# Patient Record
Sex: FEMALE | Race: BLACK OR AFRICAN AMERICAN | NOT HISPANIC OR LATINO | Employment: FULL TIME | URBAN - METROPOLITAN AREA
[De-identification: names, ages, dates, MRNs, and addresses within clinical notes are randomized per-mention and may not be internally consistent; named-entity substitution may affect disease eponyms.]

---

## 2018-05-12 ENCOUNTER — HOSPITAL ENCOUNTER (EMERGENCY)
Facility: HOSPITAL | Age: 58
Discharge: HOME | End: 2018-05-12
Payer: MEDICAID

## 2018-05-12 VITALS
WEIGHT: 135 LBS | TEMPERATURE: 98 F | SYSTOLIC BLOOD PRESSURE: 131 MMHG | BODY MASS INDEX: 24.69 KG/M2 | HEART RATE: 68 BPM | OXYGEN SATURATION: 99 % | DIASTOLIC BLOOD PRESSURE: 68 MMHG | RESPIRATION RATE: 18 BRPM

## 2018-05-12 DIAGNOSIS — S00.83XA FACIAL CONTUSION, INITIAL ENCOUNTER: Primary | ICD-10-CM

## 2018-05-12 DIAGNOSIS — S29.9XXA CHEST TRAUMA, INITIAL ENCOUNTER: ICD-10-CM

## 2018-05-12 DIAGNOSIS — S20.212A CONTUSION OF LEFT CHEST WALL, INITIAL ENCOUNTER: ICD-10-CM

## 2018-05-12 DIAGNOSIS — S09.93XA FACIAL TRAUMA, INITIAL ENCOUNTER: ICD-10-CM

## 2018-05-12 PROCEDURE — 99283 EMERGENCY DEPT VISIT LOW MDM: CPT

## 2018-05-12 RX ORDER — TRAMADOL HYDROCHLORIDE 50 MG/1
50 TABLET ORAL EVERY 6 HOURS PRN
Qty: 11 TABLET | Refills: 0 | OUTPATIENT
Start: 2018-05-12 | End: 2019-05-07

## 2018-05-12 RX ORDER — NAPROXEN 500 MG/1
500 TABLET ORAL 2 TIMES DAILY WITH MEALS
Qty: 20 TABLET | Refills: 0 | OUTPATIENT
Start: 2018-05-12 | End: 2019-05-07
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
## 2019-05-07 ENCOUNTER — HOSPITAL ENCOUNTER (EMERGENCY)
Facility: HOSPITAL | Age: 59
Discharge: HOME | End: 2019-05-07
Attending: SURGERY
Payer: MEDICAID

## 2019-05-07 VITALS
HEART RATE: 56 BPM | RESPIRATION RATE: 16 BRPM | WEIGHT: 135 LBS | SYSTOLIC BLOOD PRESSURE: 125 MMHG | BODY MASS INDEX: 24.84 KG/M2 | TEMPERATURE: 98 F | HEIGHT: 62 IN | DIASTOLIC BLOOD PRESSURE: 63 MMHG | OXYGEN SATURATION: 97 %

## 2019-05-07 DIAGNOSIS — G89.29 CHRONIC BILATERAL LOW BACK PAIN WITHOUT SCIATICA: Primary | ICD-10-CM

## 2019-05-07 DIAGNOSIS — M54.50 CHRONIC BILATERAL LOW BACK PAIN WITHOUT SCIATICA: Primary | ICD-10-CM

## 2019-05-07 PROCEDURE — 96372 THER/PROPH/DIAG INJ SC/IM: CPT | Mod: ER

## 2019-05-07 PROCEDURE — 63600175 PHARM REV CODE 636 W HCPCS: Mod: ER | Performed by: PHYSICIAN ASSISTANT

## 2019-05-07 PROCEDURE — 99284 EMERGENCY DEPT VISIT MOD MDM: CPT | Mod: 25,ER

## 2019-05-07 RX ORDER — DEXAMETHASONE SODIUM PHOSPHATE 4 MG/ML
8 INJECTION, SOLUTION INTRA-ARTICULAR; INTRALESIONAL; INTRAMUSCULAR; INTRAVENOUS; SOFT TISSUE
Status: COMPLETED | OUTPATIENT
Start: 2019-05-07 | End: 2019-05-07

## 2019-05-07 RX ORDER — DICLOFENAC SODIUM 50 MG/1
50 TABLET, DELAYED RELEASE ORAL 2 TIMES DAILY
Qty: 10 TABLET | Refills: 0 | Status: SHIPPED | OUTPATIENT
Start: 2019-05-07 | End: 2019-12-19

## 2019-05-07 RX ORDER — CYPROHEPTADINE HYDROCHLORIDE 4 MG/1
4 TABLET ORAL 3 TIMES DAILY PRN
COMMUNITY
End: 2019-12-19

## 2019-05-07 RX ADMIN — DEXAMETHASONE SODIUM PHOSPHATE 8 MG: 4 INJECTION INTRA-ARTICULAR; INTRALESIONAL; INTRAMUSCULAR; INTRAVENOUS; SOFT TISSUE at 09:05

## 2019-05-07 NOTE — DISCHARGE INSTRUCTIONS
YourX-ray revealed evidence of degenerative changes to her low back.  Your advised to follow up with her primary care provider for re-evaluation within 3 days.  Your instructed to return to the emergency department immediately for any new or worsening symptoms.

## 2019-05-07 NOTE — ED PROVIDER NOTES
Encounter Date: 2019       History     Chief Complaint   Patient presents with    Back Pain     Pt c/o lower back pain for months, states is worse over past few weeks.  No new trauma, states has been treated for same c/o in past.  Pt states needs refill on diclofenac 75mg, states medicine is .      58-year-old female presents to the emergency department for evaluation of acute exacerbation of chronic low back pain.  She reports that she has a history of bulging discs in her low back since  which has caused her pain since that time.  She reports that over the last couple of weeks the pain is seemed to increase and she is out of the diclofenac her physician usually gives her.  She states that last year she received a steroid shot for her low back which seemed to help for a few weeks and wanted to see if she could receive 1 today.  She states that she has not had a steroid shot since that time last year.  She reports that it is  a constant, throbbing, achy  pain in her low back without radiation down her lower extremities or to the upper back/neck.  No treatment was attempted at home.  She denies any other associated symptoms including headache, neck pain, chest pain, abdominal pain, nausea, vomiting, flank pain, dysuria, hematuria or numbness/weakness/tingling/swelling to the lower extremities.  She denies any bowel or bladder incontinence.          Review of patient's allergies indicates:  No Known Allergies  Past Medical History:   Diagnosis Date    Arthritis     Disc degeneration     Osteoarthritis     Thyroid disease      Past Surgical History:   Procedure Laterality Date    FOOT FRACTURE SURGERY Left     RECONSTRUCTION OF NOSE       Family History   Problem Relation Age of Onset    Asthma Mother     Cancer Mother     No Known Problems Father     No Known Problems Son      Social History     Tobacco Use    Smoking status: Never Smoker    Smokeless tobacco: Never Used   Substance Use  Topics    Alcohol use: No    Drug use: No     Review of Systems   Constitutional: Negative for activity change, appetite change and fever.   HENT: Negative for congestion, nosebleeds, rhinorrhea, sinus pain, sore throat, trouble swallowing and voice change.    Eyes: Negative for photophobia and visual disturbance.   Respiratory: Negative for cough, chest tightness, shortness of breath and wheezing.    Cardiovascular: Negative for chest pain.   Gastrointestinal: Negative for abdominal pain, constipation, diarrhea, nausea and vomiting.   Genitourinary: Negative for decreased urine volume, dysuria, flank pain, hematuria, vaginal bleeding, vaginal discharge and vaginal pain.   Musculoskeletal: Positive for back pain. Negative for joint swelling, neck pain and neck stiffness.   Neurological: Negative for dizziness, syncope, weakness, light-headedness, numbness and headaches.       Physical Exam     Initial Vitals [05/07/19 0831]   BP Pulse Resp Temp SpO2   125/63 (!) 56 16 98.1 °F (36.7 °C) 97 %      MAP       --         Physical Exam    Nursing note and vitals reviewed.  Constitutional: She appears well-developed and well-nourished. She is not diaphoretic. No distress.   HENT:   Head: Normocephalic and atraumatic.   Right Ear: External ear normal.   Left Ear: External ear normal.   Nose: Nose normal.   Mouth/Throat: Oropharynx is clear and moist.   Eyes: Conjunctivae and EOM are normal. Pupils are equal, round, and reactive to light.   Neck: Normal range of motion. Neck supple.   Cardiovascular: Normal rate, regular rhythm and normal heart sounds.   Pulmonary/Chest: Breath sounds normal. No respiratory distress. She has no wheezes. She has no rhonchi. She has no rales. She exhibits no tenderness.   Abdominal: Soft. Bowel sounds are normal. She exhibits no distension. There is no tenderness. There is no rebound.   Musculoskeletal:        Right hip: She exhibits normal range of motion, normal strength, no tenderness and  no bony tenderness.        Left hip: She exhibits normal range of motion, normal strength, no tenderness and no bony tenderness.        Cervical back: She exhibits normal range of motion, no tenderness, no bony tenderness and no swelling.        Thoracic back: She exhibits normal range of motion, no tenderness and no bony tenderness.        Lumbar back: She exhibits tenderness and bony tenderness. She exhibits normal range of motion, no swelling, no edema and no deformity.   Lymphadenopathy:     She has no cervical adenopathy.   Neurological: She is alert and oriented to person, place, and time.   Skin: Skin is warm and dry.   Psychiatric: She has a normal mood and affect.         ED Course   Procedures  Labs Reviewed - No data to display       Imaging Results          X-Ray Lumbar Spine Ap And Lateral (Final result)  Result time 05/07/19 08:49:47    Final result by TREVOR Rodriguez Sr., MD (05/07/19 08:49:47)                 Impression:      1. There is a mild amount dextroconvex curvature of the thoracolumbar spine.  2. There are mild degenerative changes between L2 and L3.      Electronically signed by: Neo Rodriguez MD  Date:    05/07/2019  Time:    08:49             Narrative:    EXAMINATION:  XR LUMBAR SPINE AP AND LATERAL    CLINICAL HISTORY:  Low back pain, <6wks, no red flags, no prior management;    COMPARISON:  None    FINDINGS:  There are 5 lumbar type vertebral bodies.  There is a mild amount dextroconvex curvature of the thoracolumbar spine.  There is no fracture or spondylolisthesis.  There is normal lumbar lordosis.  There are mild degenerative changes between L2 and L3.                                 Medical Decision Making:   Initial Assessment:   [id number] presents for evaluation of acute exacerbation of chronic low back pain. Physical exam reveals a nontoxic-appearing female in no acute distress. Patient is afebrile vital signs within normal limits.  Neurological exam reveals an alert  and oriented patient.  Lungs clear to auscultation bilaterally.  No respiratory distress or accessory muscle use noted.  Abdominal exam reveals soft abdomen, nontender palpation. No CVA tenderness noted. No tenderness to palpation noted over the paraspinal musculature or the spinous processes of the cervical or thoracic spine.  Tenderness to palpation noted over the paraspinal musculature in the spinous processes of the lumbar spine.  No bony instability or step-offs noted.  Full range of motion, sensation and peripheral pulses intact in lower extremities bilaterally. Patient ambulates well without hesitation or gait abnormality.  Differential Diagnosis:   Arthralgia  Chronic back pain  Degenerative disc disease  ED Management:  X-ray reveals a small amount of Dr. curvature of the thoracolumbar spine.  There is mild degenerative changes between L2 and L3.  Patient was given Decadron for symptom control.  Instructed the patient to follow up with her primary care provider for re-evaluation within 3 days.  Instructed patient to return to the emergency department immediately for any new or worsening symptoms.                      Clinical Impression:       ICD-10-CM ICD-9-CM   1. Chronic bilateral low back pain without sciatica M54.5 724.2    G89.29 338.29                                Diandra Benavides PA-C  05/07/19 0916

## 2019-10-10 ENCOUNTER — HOSPITAL ENCOUNTER (EMERGENCY)
Facility: HOSPITAL | Age: 59
Discharge: HOME | End: 2019-10-10
Attending: SURGERY
Payer: MEDICAID

## 2019-10-10 VITALS
SYSTOLIC BLOOD PRESSURE: 134 MMHG | HEIGHT: 62 IN | DIASTOLIC BLOOD PRESSURE: 76 MMHG | HEART RATE: 78 BPM | TEMPERATURE: 98 F | BODY MASS INDEX: 23 KG/M2 | RESPIRATION RATE: 18 BRPM | WEIGHT: 125 LBS | OXYGEN SATURATION: 100 %

## 2019-10-10 DIAGNOSIS — M54.30 BACK PAIN WITH SCIATICA: ICD-10-CM

## 2019-10-10 DIAGNOSIS — M54.9 BACK PAIN WITH SCIATICA: ICD-10-CM

## 2019-10-10 DIAGNOSIS — N30.01 ACUTE CYSTITIS WITH HEMATURIA: Primary | ICD-10-CM

## 2019-10-10 LAB
BACTERIA #/AREA URNS AUTO: ABNORMAL /HPF
BILIRUB UR QL STRIP: NEGATIVE
CLARITY UR REFRACT.AUTO: CLEAR
COLOR UR AUTO: ABNORMAL
GLUCOSE UR QL STRIP: NEGATIVE
HGB UR QL STRIP: ABNORMAL
KETONES UR QL STRIP: ABNORMAL
LEUKOCYTE ESTERASE UR QL STRIP: ABNORMAL
MICROSCOPIC COMMENT: ABNORMAL
NITRITE UR QL STRIP: NEGATIVE
PH UR STRIP: 5 [PH] (ref 5–8)
PROT UR QL STRIP: ABNORMAL
RBC #/AREA URNS AUTO: 3 /HPF (ref 0–4)
SP GR UR STRIP: 1.02 (ref 1–1.03)
URN SPEC COLLECT METH UR: ABNORMAL
UROBILINOGEN UR STRIP-ACNC: ABNORMAL EU/DL
WBC #/AREA URNS AUTO: 15 /HPF (ref 0–5)

## 2019-10-10 PROCEDURE — 99284 EMERGENCY DEPT VISIT MOD MDM: CPT | Mod: 25,ER

## 2019-10-10 PROCEDURE — 96372 THER/PROPH/DIAG INJ SC/IM: CPT | Mod: ER

## 2019-10-10 PROCEDURE — 81000 URINALYSIS NONAUTO W/SCOPE: CPT | Mod: ER

## 2019-10-10 PROCEDURE — 87086 URINE CULTURE/COLONY COUNT: CPT | Mod: ER

## 2019-10-10 PROCEDURE — 63600175 PHARM REV CODE 636 W HCPCS: Mod: ER | Performed by: SURGERY

## 2019-10-10 PROCEDURE — 25000003 PHARM REV CODE 250: Mod: ER | Performed by: SURGERY

## 2019-10-10 RX ORDER — NITROFURANTOIN 25; 75 MG/1; MG/1
100 CAPSULE ORAL 2 TIMES DAILY
Qty: 10 CAPSULE | Refills: 0 | Status: SHIPPED | OUTPATIENT
Start: 2019-10-10 | End: 2019-10-15

## 2019-10-10 RX ORDER — BUTALBITAL, ACETAMINOPHEN AND CAFFEINE 50; 325; 40 MG/1; MG/1; MG/1
2 TABLET ORAL
Status: COMPLETED | OUTPATIENT
Start: 2019-10-10 | End: 2019-10-10

## 2019-10-10 RX ORDER — BUTALBITAL, ACETAMINOPHEN AND CAFFEINE 50; 325; 40 MG/1; MG/1; MG/1
1 TABLET ORAL EVERY 6 HOURS PRN
Qty: 20 TABLET | Refills: 0 | Status: SHIPPED | OUTPATIENT
Start: 2019-10-10 | End: 2019-11-09

## 2019-10-10 RX ORDER — CEPHALEXIN 500 MG/1
500 CAPSULE ORAL EVERY 8 HOURS
Qty: 15 CAPSULE | Refills: 0 | Status: SHIPPED | OUTPATIENT
Start: 2019-10-10 | End: 2019-10-10

## 2019-10-10 RX ORDER — HYDROCODONE BITARTRATE AND HOMATROPINE METHYLBROMIDE ORAL SOLUTION 5; 1.5 MG/5ML; MG/5ML
5 LIQUID ORAL EVERY 4 HOURS PRN
Qty: 90 ML | Refills: 0 | Status: SHIPPED | OUTPATIENT
Start: 2019-10-10 | End: 2019-10-10

## 2019-10-10 RX ORDER — CEFTRIAXONE 1 G/1
250 INJECTION, POWDER, FOR SOLUTION INTRAMUSCULAR; INTRAVENOUS
Status: COMPLETED | OUTPATIENT
Start: 2019-10-10 | End: 2019-10-10

## 2019-10-10 RX ORDER — FLUCONAZOLE 200 MG/1
200 TABLET ORAL DAILY
Qty: 2 TABLET | Refills: 0 | Status: SHIPPED | OUTPATIENT
Start: 2019-10-10 | End: 2019-10-12

## 2019-10-10 RX ORDER — AZITHROMYCIN 250 MG/1
1000 TABLET, FILM COATED ORAL
Status: COMPLETED | OUTPATIENT
Start: 2019-10-10 | End: 2019-10-10

## 2019-10-10 RX ADMIN — CEFTRIAXONE SODIUM 250 MG: 1 INJECTION, POWDER, FOR SOLUTION INTRAMUSCULAR; INTRAVENOUS at 08:10

## 2019-10-10 RX ADMIN — AZITHROMYCIN MONOHYDRATE 1000 MG: 250 TABLET ORAL at 08:10

## 2019-10-10 RX ADMIN — BUTALBITAL, ACETAMINOPHEN, AND CAFFEINE 2 TABLET: 50; 325; 40 TABLET ORAL at 07:10

## 2019-10-10 NOTE — ED NOTES
[facility] instructions given, pt verbalized understanding, pt ambulatory to restroom, steady gait noted

## 2019-10-10 NOTE — ED PROVIDER NOTES
"Encounter Date: 10/10/2019       History     Chief Complaint   Patient presents with    Abdominal Pain     Lower quadrant abdominal/pelvic pain radiating to back with malodorous vaginal discharge for several months.  "It's because my bowels are backed up into my back because of the herniated disc, it's happened before."  Denies any dysuria/hematuria, admits to having unprotected sex     She complains of chronic back pain which is worse today pain radiates down to her flanks and down both legs she also complains of dysuria    The history is provided by the patient.   Dysuria    This is a new problem. The current episode started several weeks ago. The problem occurs intermittently. The problem has been unchanged. The quality of the pain is described as burning. She is sexually active. She has tried nothing for the symptoms. Her past medical history does not include kidney stones.     Review of patient's allergies indicates:  No Known Allergies  Past Medical History:   Diagnosis Date    Arthritis     Disc degeneration     Osteoarthritis     Thyroid disease      Past Surgical History:   Procedure Laterality Date    FOOT FRACTURE SURGERY Left     HYSTERECTOMY      RECONSTRUCTION OF NOSE       Family History   Problem Relation Age of Onset    Asthma Mother     Cancer Mother     No Known Problems Father     No Known Problems Son      Social History     Tobacco Use    Smoking status: Never Smoker    Smokeless tobacco: Never Used   Substance Use Topics    Alcohol use: No    Drug use: No     Review of Systems   Constitutional: Negative.    HENT: Negative.    Eyes: Negative.    Respiratory: Negative.    Cardiovascular: Negative.    Gastrointestinal: Negative.    Endocrine: Negative.    Genitourinary: Positive for dysuria.   Musculoskeletal: Positive for back pain.   Skin: Negative.    Allergic/Immunologic: Negative.    Hematological: Negative.    Psychiatric/Behavioral: Negative.        Physical Exam "     Initial Vitals [10/10/19 0700]   BP Pulse Resp Temp SpO2   129/67 84 18 98.1 °F (36.7 °C) 98 %      MAP       --         Physical Exam    Nursing note and vitals reviewed.  Constitutional: She appears well-developed and well-nourished.   HENT:   Head: Normocephalic.   Eyes: Conjunctivae are normal.   Neck: Normal range of motion.   Cardiovascular: Normal rate.   Pulmonary/Chest: Breath sounds normal.   Abdominal: Soft. There is no tenderness. There is no guarding.   Genitourinary: Vagina normal.   Musculoskeletal: She exhibits tenderness.        Lumbar back: She exhibits tenderness. She exhibits normal range of motion, no bony tenderness, no deformity, no pain, no spasm and normal pulse.        Back:    Pain on palpation   Neurological: She is alert and oriented to person, place, and time. GCS score is 15. GCS eye subscore is 4. GCS verbal subscore is 5. GCS motor subscore is 6.   Skin: Skin is warm.   Psychiatric: She has a normal mood and affect.         ED Course   Procedures  Labs Reviewed   URINALYSIS, REFLEX TO URINE CULTURE - Abnormal; Notable for the following components:       Result Value    Protein, UA Trace (*)     Ketones, UA 1+ (*)     Occult Blood UA 2+ (*)     Urobilinogen, UA 4.0-6.0 (*)     Leukocytes, UA 3+ (*)     All other components within normal limits    Narrative:     Preferred Collection Type->Urine, Clean Catch   URINALYSIS MICROSCOPIC - Abnormal; Notable for the following components:    WBC, UA 15 (*)     All other components within normal limits    Narrative:     Preferred Collection Type->Urine, Clean Catch   C. TRACHOMATIS/N. GONORRHOEAE BY AMP DNA   CULTURE, URINE          Imaging Results    None          Medical Decision Making:   Initial Assessment:   UTI with chronic back pain  ED Management:  Recommend treatment with antibiotics for UTI await culture and sensitivity referred to pain management for back pain                      Clinical Impression:       ICD-10-CM ICD-9-CM    1. Acute cystitis with hematuria N30.01 595.0   2. Back pain with sciatica M54.9 724.3    M54.30          Disposition:   Disposition: Discharged  Condition: Stable                        OMID Escamilla III, MD  10/10/19 1000

## 2019-10-11 LAB — BACTERIA UR CULT: NO GROWTH

## 2019-12-19 ENCOUNTER — HOSPITAL ENCOUNTER (EMERGENCY)
Facility: HOSPITAL | Age: 59
Discharge: HOME | End: 2019-12-19
Attending: FAMILY MEDICINE
Payer: MEDICAID

## 2019-12-19 VITALS
WEIGHT: 111.38 LBS | DIASTOLIC BLOOD PRESSURE: 60 MMHG | HEART RATE: 50 BPM | RESPIRATION RATE: 19 BRPM | OXYGEN SATURATION: 100 % | SYSTOLIC BLOOD PRESSURE: 128 MMHG | TEMPERATURE: 98 F | BODY MASS INDEX: 20.38 KG/M2

## 2019-12-19 DIAGNOSIS — E03.9 HYPOTHYROIDISM, UNSPECIFIED TYPE: ICD-10-CM

## 2019-12-19 DIAGNOSIS — R07.9 CHEST PAIN: ICD-10-CM

## 2019-12-19 DIAGNOSIS — M25.50 POLYARTHRALGIA: Primary | ICD-10-CM

## 2019-12-19 LAB
ALBUMIN SERPL BCP-MCNC: 4.2 G/DL (ref 3.5–5.2)
ALP SERPL-CCNC: 117 U/L (ref 38–126)
ALT SERPL W/O P-5'-P-CCNC: 14 U/L (ref 10–44)
ANION GAP SERPL CALC-SCNC: 4 MMOL/L (ref 8–16)
AST SERPL-CCNC: 25 U/L (ref 15–46)
BASOPHILS # BLD AUTO: 0.03 K/UL (ref 0–0.2)
BASOPHILS NFR BLD: 0.7 % (ref 0–1.9)
BILIRUB SERPL-MCNC: 0.3 MG/DL (ref 0.1–1)
BUN SERPL-MCNC: 13 MG/DL (ref 7–17)
CALCIUM SERPL-MCNC: 9.6 MG/DL (ref 8.7–10.5)
CHLORIDE SERPL-SCNC: 108 MMOL/L (ref 95–110)
CO2 SERPL-SCNC: 28 MMOL/L (ref 23–29)
CREAT SERPL-MCNC: 0.73 MG/DL (ref 0.5–1.4)
DIFFERENTIAL METHOD: ABNORMAL
EOSINOPHIL # BLD AUTO: 0.1 K/UL (ref 0–0.5)
EOSINOPHIL NFR BLD: 2.4 % (ref 0–8)
ERYTHROCYTE [DISTWIDTH] IN BLOOD BY AUTOMATED COUNT: 12.4 % (ref 11.5–14.5)
EST. GFR  (AFRICAN AMERICAN): >60 ML/MIN/1.73 M^2
EST. GFR  (NON AFRICAN AMERICAN): >60 ML/MIN/1.73 M^2
GLUCOSE SERPL-MCNC: 82 MG/DL (ref 70–110)
HCT VFR BLD AUTO: 39.3 % (ref 37–48.5)
HGB BLD-MCNC: 13.1 G/DL (ref 12–16)
IMM GRANULOCYTES # BLD AUTO: 0.01 K/UL (ref 0–0.04)
IMM GRANULOCYTES NFR BLD AUTO: 0.2 % (ref 0–0.5)
LYMPHOCYTES # BLD AUTO: 2.3 K/UL (ref 1–4.8)
LYMPHOCYTES NFR BLD: 53.6 % (ref 18–48)
MCH RBC QN AUTO: 32.5 PG (ref 27–31)
MCHC RBC AUTO-ENTMCNC: 33.3 G/DL (ref 32–36)
MCV RBC AUTO: 98 FL (ref 82–98)
MONOCYTES # BLD AUTO: 0.4 K/UL (ref 0.3–1)
MONOCYTES NFR BLD: 9.4 % (ref 4–15)
NEUTROPHILS # BLD AUTO: 1.4 K/UL (ref 1.8–7.7)
NEUTROPHILS NFR BLD: 33.7 % (ref 38–73)
NRBC BLD-RTO: 0 /100 WBC
NT-PROBNP: 32 PG/ML (ref 5–900)
PLATELET # BLD AUTO: 190 K/UL (ref 150–350)
PMV BLD AUTO: 10.3 FL (ref 9.2–12.9)
POTASSIUM SERPL-SCNC: 3.9 MMOL/L (ref 3.5–5.1)
PROT SERPL-MCNC: 7.7 G/DL (ref 6–8.4)
RBC # BLD AUTO: 4.03 M/UL (ref 4–5.4)
SODIUM SERPL-SCNC: 140 MMOL/L (ref 136–145)
T4 FREE SERPL-MCNC: 0.8 NG/DL (ref 0.71–1.51)
TROPONIN I SERPL DL<=0.01 NG/ML-MCNC: <0.012 NG/ML (ref 0.01–0.03)
TROPONIN I SERPL DL<=0.01 NG/ML-MCNC: <0.012 NG/ML (ref 0.01–0.03)
TSH SERPL DL<=0.005 MIU/L-ACNC: 12.4 UIU/ML (ref 0.4–4)
WBC # BLD AUTO: 4.25 K/UL (ref 3.9–12.7)

## 2019-12-19 PROCEDURE — 84439 ASSAY OF FREE THYROXINE: CPT

## 2019-12-19 PROCEDURE — 83880 ASSAY OF NATRIURETIC PEPTIDE: CPT | Mod: ER

## 2019-12-19 PROCEDURE — 84484 ASSAY OF TROPONIN QUANT: CPT | Mod: ER

## 2019-12-19 PROCEDURE — 99285 EMERGENCY DEPT VISIT HI MDM: CPT | Mod: 25,ER

## 2019-12-19 PROCEDURE — 93010 EKG 12-LEAD: ICD-10-PCS | Mod: ,,, | Performed by: INTERNAL MEDICINE

## 2019-12-19 PROCEDURE — 93010 ELECTROCARDIOGRAM REPORT: CPT | Mod: ,,, | Performed by: INTERNAL MEDICINE

## 2019-12-19 PROCEDURE — 84443 ASSAY THYROID STIM HORMONE: CPT | Mod: ER

## 2019-12-19 PROCEDURE — 25000003 PHARM REV CODE 250: Mod: ER | Performed by: FAMILY MEDICINE

## 2019-12-19 PROCEDURE — 93005 ELECTROCARDIOGRAM TRACING: CPT | Mod: ER

## 2019-12-19 PROCEDURE — 85025 COMPLETE CBC W/AUTO DIFF WBC: CPT | Mod: ER

## 2019-12-19 PROCEDURE — 80053 COMPREHEN METABOLIC PANEL: CPT | Mod: ER

## 2019-12-19 RX ORDER — NAPROXEN SODIUM 220 MG
220 TABLET ORAL
COMMUNITY

## 2019-12-19 RX ORDER — LEVOTHYROXINE SODIUM 50 UG/1
50 TABLET ORAL DAILY
Qty: 30 TABLET | Refills: 0 | Status: SHIPPED | OUTPATIENT
Start: 2019-12-19 | End: 2020-01-18

## 2019-12-19 RX ORDER — ASPIRIN 325 MG
325 TABLET ORAL
Status: COMPLETED | OUTPATIENT
Start: 2019-12-19 | End: 2019-12-19

## 2019-12-19 RX ORDER — NAPROXEN 500 MG/1
500 TABLET ORAL 2 TIMES DAILY WITH MEALS
Qty: 60 TABLET | Refills: 0 | Status: SHIPPED | OUTPATIENT
Start: 2019-12-19

## 2019-12-19 RX ADMIN — ASPIRIN 325 MG ORAL TABLET 325 MG: 325 PILL ORAL at 06:12

## 2019-12-31 ENCOUNTER — HOSPITAL ENCOUNTER (EMERGENCY)
Facility: HOSPITAL | Age: 59
Discharge: HOME | End: 2019-12-31
Attending: SURGERY
Payer: MEDICAID

## 2019-12-31 VITALS
DIASTOLIC BLOOD PRESSURE: 70 MMHG | BODY MASS INDEX: 23.74 KG/M2 | OXYGEN SATURATION: 99 % | WEIGHT: 129 LBS | HEIGHT: 62 IN | SYSTOLIC BLOOD PRESSURE: 140 MMHG | HEART RATE: 66 BPM | RESPIRATION RATE: 18 BRPM | TEMPERATURE: 98 F

## 2019-12-31 DIAGNOSIS — R20.0 NUMBNESS AND TINGLING: ICD-10-CM

## 2019-12-31 DIAGNOSIS — R20.2 NUMBNESS AND TINGLING: ICD-10-CM

## 2019-12-31 DIAGNOSIS — R20.2 PARESTHESIAS IN RIGHT HAND: ICD-10-CM

## 2019-12-31 DIAGNOSIS — L72.3 SEBACEOUS CYST: Primary | ICD-10-CM

## 2019-12-31 PROCEDURE — 93010 ELECTROCARDIOGRAM REPORT: CPT | Mod: ,,, | Performed by: INTERNAL MEDICINE

## 2019-12-31 PROCEDURE — 25000003 PHARM REV CODE 250: Mod: ER | Performed by: SURGERY

## 2019-12-31 PROCEDURE — 93010 EKG 12-LEAD: ICD-10-PCS | Mod: ,,, | Performed by: INTERNAL MEDICINE

## 2019-12-31 PROCEDURE — 93005 ELECTROCARDIOGRAM TRACING: CPT | Mod: ER

## 2019-12-31 PROCEDURE — 99284 EMERGENCY DEPT VISIT MOD MDM: CPT | Mod: 25,ER

## 2019-12-31 RX ORDER — BUTALBITAL, ACETAMINOPHEN AND CAFFEINE 50; 325; 40 MG/1; MG/1; MG/1
1 TABLET ORAL
Status: COMPLETED | OUTPATIENT
Start: 2019-12-31 | End: 2019-12-31

## 2019-12-31 RX ORDER — BUTALBITAL, ACETAMINOPHEN AND CAFFEINE 50; 325; 40 MG/1; MG/1; MG/1
1 TABLET ORAL EVERY 6 HOURS PRN
Qty: 12 TABLET | Refills: 0 | Status: SHIPPED | OUTPATIENT
Start: 2019-12-31 | End: 2020-01-30

## 2019-12-31 RX ADMIN — BUTALBITAL, ACETAMINOPHEN, AND CAFFEINE 1 TABLET: 50; 325; 40 TABLET ORAL at 09:12

## 2019-12-31 NOTE — ED TRIAGE NOTES
Pt presents to Ed c/o pain and numbness to right side of face, right shoulder, right arm intermittently x 2 weeks but worsening today. Denies sob. Denies n/v/d. Denies blurred vision. Rates pain 8/10. No meds pta. Recent blood work .

## 2019-12-31 NOTE — ED PROVIDER NOTES
Encounter Date: 12/31/2019       History     Chief Complaint   Patient presents with    Numbness     Numbess to right shoulder, right arm and right breast area off and on for 1 week     Patient complains that her right arm and hand gets numb when she bends her arm in a certain direction or sleeps on it.  The symptoms have been going on for week.  She also complains of joint pain in her right shoulder and the her lateral right rib cage.  She got chest pain workup 1 week ago which was negative        Review of patient's allergies indicates:  No Known Allergies  Past Medical History:   Diagnosis Date    Arthritis     Disc degeneration     Osteoarthritis     Thyroid disease      Past Surgical History:   Procedure Laterality Date    FOOT FRACTURE SURGERY Left     HYSTERECTOMY      RECONSTRUCTION OF NOSE       Family History   Problem Relation Age of Onset    Asthma Mother     Cancer Mother     No Known Problems Father     No Known Problems Son      Social History     Tobacco Use    Smoking status: Never Smoker    Smokeless tobacco: Never Used   Substance Use Topics    Alcohol use: No    Drug use: No     Review of Systems   Constitutional: Negative.    HENT: Negative.    Eyes: Negative.    Respiratory: Negative.    Cardiovascular: Negative.    Gastrointestinal: Negative.    Endocrine: Negative.    Genitourinary: Negative.    Musculoskeletal: Negative.    Allergic/Immunologic: Negative.    Neurological: Positive for numbness.   Hematological: Negative.    Psychiatric/Behavioral: Negative.        Physical Exam     Initial Vitals [12/31/19 0905]   BP Pulse Resp Temp SpO2   138/63 64 18 97.5 °F (36.4 °C) 100 %      MAP       --         Physical Exam    Nursing note and vitals reviewed.  Constitutional: She appears well-developed and well-nourished.   HENT:   Head: Normocephalic.       Eyes: Conjunctivae are normal.   Neck: Normal range of motion.   Cardiovascular: Regular rhythm, normal heart sounds and intact  distal pulses.   Pulses:       Radial pulses are 3+ on the right side.   Bert cardia of 50  Palpable right axillary right brachial artery pulse   Pulmonary/Chest: Breath sounds normal.   Abdominal: Soft.   Musculoskeletal: Normal range of motion. She exhibits tenderness.        Right shoulder: She exhibits tenderness and bony tenderness.        Arms:  Neurological: She is alert and oriented to person, place, and time. No cranial nerve deficit or sensory deficit. GCS score is 15. GCS eye subscore is 4. GCS verbal subscore is 5. GCS motor subscore is 6.   NIH stroke scale 0   Skin: Skin is warm.   Psychiatric: She has a normal mood and affect.         ED Course   Procedures  Labs Reviewed - No data to display  EKG Readings: (Independently Interpreted)   Rhythm: Sinus Bradycardia. Heart Rate: 55. Ectopy: No Ectopy. Conduction: Normal. ST Segments: Normal ST Segments. T Waves: Normal. Clinical Impression: Normal Sinus Rhythm     ECG Results          EKG 12-lead (Final result)  Result time 12/31/19 12:15:11    Final result by Interface, Lab [facility] (12/31/19 12:15:11)                 Narrative:    Test Reason : R20.0,R20.2,    Vent. Rate : 055 BPM     Atrial Rate : 055 BPM     P-R Int : 200 ms          QRS Dur : 080 ms      QT Int : 422 ms       P-R-T Axes : 061 059 071 degrees     QTc Int : 403 ms    Sinus bradycardia  Nonspecific ST and T wave abnormality  Baseline Artifact    Confirmed by Andre PAGE, Brian BUTTERFIELD ([id number]) on 12/31/2019 12:15:06 PM    Referred By: [email or web address]   SELF           Confirmed By:Brian Powell MD                            Imaging Results          CT Head Without Contrast (Final result)  Result time 12/31/19 09:57:16    Final result by TREVOR Rodriguez Sr., MD (12/31/19 09:57:16)                 Impression:      1. There is a 17 mm oval shaped area of increased density in the subcutaneous fat overlying the posterior aspect of the left parietal bone.  If additional imaging evaluation is  clinically indicated, recommend consideration an MRI examination of the head without and with IV contrast.  2. No intracranial abnormality.  All CT scans at this facility use dose modulation, iterative reconstruction, and/or weight base dosing when appropriate to reduce radiation dose when appropriate to reduce radiation dose to as low as reasonably achievable.      Electronically signed by: Neo Rodriguez MD  Date:    12/31/2019  Time:    09:57             Narrative:    EXAMINATION:  CT HEAD WITHOUT CONTRAST    CLINICAL HISTORY:  Dizziness;    TECHNIQUE:  Standard brain CT protocol without IV contrast was performed.    COMPARISON:  None    FINDINGS:  The ventricles have a normal size, position, and appearance. There is no abnormal intracranial mass or intracranial hemorrhage. There is no skull fracture. The paranasal sinuses are normal in appearance.  There is a 17 mm oval shaped area of increased density in the subcutaneous fat overlying the posterior aspect of the left parietal bone.                                 Medical Decision Making:   Initial Assessment:   Sebaceous cyst scalp with polyarthralgia and paresthesias  ED Management:  Normal neurologic and vascular exam.  CT scan head is negative EKG does not show any acute changes  Recommend follow-up with neurologist for paresthesias and general surgeon for scalp cyst                                 Clinical Impression:       ICD-10-CM ICD-9-CM   1. Sebaceous cyst L72.3 706.2   2. Numbness and tingling R20.0 782.0    R20.2    3. Paresthesias in right hand R20.2 782.0         Disposition:   Disposition: Discharged  Condition: Stable                     OMID Escamilla III, MD  12/31/19 1047       OMID Escamilla III, MD  01/08/20 1115

## 2020-03-12 DIAGNOSIS — M54.50 LOW BACK PAIN: Primary | ICD-10-CM

## 2021-06-07 DIAGNOSIS — Z12.31 SCREENING MAMMOGRAM, ENCOUNTER FOR: Primary | ICD-10-CM

## 2021-06-08 ENCOUNTER — HOSPITAL ENCOUNTER (OUTPATIENT)
Dept: RADIOLOGY | Facility: HOSPITAL | Age: 61
Discharge: HOME | End: 2021-06-08
Attending: NURSE PRACTITIONER
Payer: MEDICAID

## 2021-06-08 DIAGNOSIS — M54.50 LOW BACK PAIN: ICD-10-CM

## 2021-06-08 PROCEDURE — 72100 X-RAY EXAM L-S SPINE 2/3 VWS: CPT | Mod: TC,FY,PO

## 2022-02-18 ENCOUNTER — HOSPITAL ENCOUNTER (OUTPATIENT)
Dept: RADIOLOGY | Facility: HOSPITAL | Age: 62
Discharge: HOME | End: 2022-02-18
Attending: NURSE PRACTITIONER
Payer: MEDICAID

## 2022-02-18 DIAGNOSIS — M54.50 ACUTE BILATERAL LOW BACK PAIN WITHOUT SCIATICA: ICD-10-CM

## 2022-02-18 PROCEDURE — 72100 X-RAY EXAM L-S SPINE 2/3 VWS: CPT | Mod: TC,FY,PO

## 2022-10-26 ENCOUNTER — HOSPITAL ENCOUNTER (EMERGENCY)
Facility: HOSPITAL | Age: 62
Discharge: HOME | End: 2022-10-26
Attending: EMERGENCY MEDICINE
Payer: MEDICAID

## 2022-10-26 VITALS
SYSTOLIC BLOOD PRESSURE: 122 MMHG | DIASTOLIC BLOOD PRESSURE: 85 MMHG | OXYGEN SATURATION: 99 % | RESPIRATION RATE: 17 BRPM | WEIGHT: 130 LBS | HEART RATE: 60 BPM | TEMPERATURE: 99 F | BODY MASS INDEX: 23.78 KG/M2

## 2022-10-26 DIAGNOSIS — K59.00 CONSTIPATION, UNSPECIFIED CONSTIPATION TYPE: ICD-10-CM

## 2022-10-26 DIAGNOSIS — R11.2 NAUSEA AND VOMITING, UNSPECIFIED VOMITING TYPE: Primary | ICD-10-CM

## 2022-10-26 LAB
ALBUMIN SERPL BCP-MCNC: 4.4 G/DL (ref 3.5–5.2)
ALP SERPL-CCNC: 92 U/L (ref 38–126)
ALT SERPL W/O P-5'-P-CCNC: 18 U/L (ref 10–44)
ANION GAP SERPL CALC-SCNC: 9 MMOL/L (ref 8–16)
AST SERPL-CCNC: 25 U/L (ref 15–46)
BASOPHILS # BLD AUTO: 0.02 K/UL (ref 0–0.2)
BASOPHILS NFR BLD: 0.6 % (ref 0–1.9)
BILIRUB SERPL-MCNC: 0.3 MG/DL (ref 0.1–1)
CALCIUM SERPL-MCNC: 9.2 MG/DL (ref 8.7–10.5)
CHLORIDE SERPL-SCNC: 110 MMOL/L (ref 95–110)
CO2 SERPL-SCNC: 26 MMOL/L (ref 23–29)
CREAT SERPL-MCNC: 0.65 MG/DL (ref 0.5–1.4)
DIFFERENTIAL METHOD: ABNORMAL
EOSINOPHIL # BLD AUTO: 0.1 K/UL (ref 0–0.5)
EOSINOPHIL NFR BLD: 2.5 % (ref 0–8)
ERYTHROCYTE [DISTWIDTH] IN BLOOD BY AUTOMATED COUNT: 12.2 % (ref 11.5–14.5)
EST. GFR  (NO RACE VARIABLE): >60 ML/MIN/1.73 M^2
GLUCOSE SERPL-MCNC: 98 MG/DL (ref 70–110)
HCT VFR BLD AUTO: 37.1 % (ref 37–48.5)
HGB BLD-MCNC: 12.5 G/DL (ref 12–16)
IMM GRANULOCYTES # BLD AUTO: 0 K/UL (ref 0–0.04)
IMM GRANULOCYTES NFR BLD AUTO: 0 % (ref 0–0.5)
LIPASE SERPL-CCNC: 28 U/L (ref 23–300)
LYMPHOCYTES # BLD AUTO: 1.7 K/UL (ref 1–4.8)
LYMPHOCYTES NFR BLD: 52.4 % (ref 18–48)
MCH RBC QN AUTO: 32.9 PG (ref 27–31)
MCHC RBC AUTO-ENTMCNC: 33.7 G/DL (ref 32–36)
MCV RBC AUTO: 98 FL (ref 82–98)
MONOCYTES # BLD AUTO: 0.4 K/UL (ref 0.3–1)
MONOCYTES NFR BLD: 12.7 % (ref 4–15)
NEUTROPHILS # BLD AUTO: 1 K/UL (ref 1.8–7.7)
NEUTROPHILS NFR BLD: 31.8 % (ref 38–73)
NRBC BLD-RTO: 0 /100 WBC
PLATELET # BLD AUTO: 199 K/UL (ref 150–450)
PMV BLD AUTO: 10.2 FL (ref 9.2–12.9)
POTASSIUM SERPL-SCNC: 3.8 MMOL/L (ref 3.5–5.1)
PROT SERPL-MCNC: 7.8 G/DL (ref 6–8.4)
RBC # BLD AUTO: 3.8 M/UL (ref 4–5.4)
SODIUM SERPL-SCNC: 145 MMOL/L (ref 136–145)
UUN UR-MCNC: 16 MG/DL (ref 7–17)
WBC # BLD AUTO: 3.15 K/UL (ref 3.9–12.7)

## 2022-10-26 PROCEDURE — 96374 THER/PROPH/DIAG INJ IV PUSH: CPT | Mod: ER

## 2022-10-26 PROCEDURE — 99285 EMERGENCY DEPT VISIT HI MDM: CPT | Mod: 25,ER

## 2022-10-26 PROCEDURE — 25500020 PHARM REV CODE 255: Mod: ER | Performed by: EMERGENCY MEDICINE

## 2022-10-26 PROCEDURE — 85025 COMPLETE CBC W/AUTO DIFF WBC: CPT | Mod: ER | Performed by: EMERGENCY MEDICINE

## 2022-10-26 PROCEDURE — 80053 COMPREHEN METABOLIC PANEL: CPT | Mod: ER | Performed by: EMERGENCY MEDICINE

## 2022-10-26 PROCEDURE — 83690 ASSAY OF LIPASE: CPT | Mod: ER | Performed by: EMERGENCY MEDICINE

## 2022-10-26 PROCEDURE — 25000003 PHARM REV CODE 250: Mod: ER | Performed by: EMERGENCY MEDICINE

## 2022-10-26 PROCEDURE — 63600175 PHARM REV CODE 636 W HCPCS: Mod: ER | Performed by: EMERGENCY MEDICINE

## 2022-10-26 PROCEDURE — 96361 HYDRATE IV INFUSION ADD-ON: CPT | Mod: ER

## 2022-10-26 RX ORDER — ONDANSETRON 4 MG/1
4 TABLET, ORALLY DISINTEGRATING ORAL EVERY 6 HOURS PRN
Qty: 20 TABLET | Refills: 0 | Status: SHIPPED | OUTPATIENT
Start: 2022-10-26

## 2022-10-26 RX ORDER — DOCUSATE SODIUM 100 MG/1
100 CAPSULE, LIQUID FILLED ORAL 2 TIMES DAILY PRN
Qty: 60 CAPSULE | Refills: 0 | Status: SHIPPED | OUTPATIENT
Start: 2022-10-26

## 2022-10-26 RX ORDER — ONDANSETRON 2 MG/ML
4 INJECTION INTRAMUSCULAR; INTRAVENOUS
Status: COMPLETED | OUTPATIENT
Start: 2022-10-26 | End: 2022-10-26

## 2022-10-26 RX ORDER — SODIUM CHLORIDE 9 MG/ML
INJECTION, SOLUTION INTRAVENOUS
Status: COMPLETED | OUTPATIENT
Start: 2022-10-26 | End: 2022-10-26

## 2022-10-26 RX ADMIN — IOHEXOL 100 ML: 350 INJECTION, SOLUTION INTRAVENOUS at 11:10

## 2022-10-26 RX ADMIN — SODIUM CHLORIDE: 0.9 INJECTION, SOLUTION INTRAVENOUS at 09:10

## 2022-10-26 RX ADMIN — ONDANSETRON HYDROCHLORIDE 4 MG: 2 INJECTION, SOLUTION INTRAMUSCULAR; INTRAVENOUS at 09:10

## 2022-10-26 NOTE — ED PROVIDER NOTES
Encounter Date: 10/26/2022       History     Chief Complaint   Patient presents with    Vomiting    Nausea    Abdominal Pain     I am having nausea vomiting and pain from my arthritis around my vagina. I am hurting all over. I missed work yesterday and need a work note also.      61-year-old female with a history of osteoarthritis presents with 1 day of vomiting with abdominal pain.  Patient also reports being constipated.  She reports diffuse body aches consistent with her osteoarthritis.  She denies fever, dysuria, cough, chest pain, shortness of breath or headache.  Last bowel movement was yesterday.      Review of patient's allergies indicates:  No Known Allergies  Past Medical History:   Diagnosis Date    Arthritis     Disc degeneration     Osteoarthritis     Thyroid disease      Past Surgical History:   Procedure Laterality Date    FOOT FRACTURE SURGERY Left     HYSTERECTOMY      RECONSTRUCTION OF NOSE       Family History   Problem Relation Age of Onset    Asthma Mother     Cancer Mother     No Known Problems Father     No Known Problems Son      Social History     Tobacco Use    Smoking status: Never    Smokeless tobacco: Never   Substance Use Topics    Alcohol use: No    Drug use: No     Review of Systems   Constitutional:  Negative for fever.   Eyes:  Negative for pain.   Respiratory:  Negative for shortness of breath.    Cardiovascular:  Negative for chest pain.   Gastrointestinal:  Positive for abdominal pain, constipation, nausea and vomiting.   Genitourinary:  Negative for difficulty urinating.   Musculoskeletal:  Positive for arthralgias. Negative for back pain and neck pain.   Neurological:  Negative for headaches.   Psychiatric/Behavioral:  Negative for confusion.      Physical Exam     Initial Vitals [10/26/22 0923]   BP Pulse Resp Temp SpO2   122/87 62 15 98.6 °F (37 °C) 98 %      MAP       --         Physical Exam    Nursing note and vitals reviewed.  HENT:   Head: Atraumatic.   Eyes:  Conjunctivae and EOM are normal.   Neck:   Normal range of motion.  Cardiovascular:      Exam reveals no gallop and no friction rub.       No murmur heard.  Pulmonary/Chest: Breath sounds normal. No respiratory distress.   Abdominal: Abdomen is soft. She exhibits no distension. There is abdominal tenderness (Diffuse). There is no rebound.   Musculoskeletal:      Cervical back: Normal range of motion.     Neurological: She is alert and oriented to person, place, and time.   Psychiatric: She has a normal mood and affect.       ED Course   Procedures  Labs Reviewed   CBC W/ AUTO DIFFERENTIAL - Abnormal; Notable for the following components:       Result Value    WBC 3.15 (*)     RBC 3.80 (*)     MCH 32.9 (*)     Gran # (ANC) 1.0 (*)     Gran % 31.8 (*)     Lymph % 52.4 (*)     All other components within normal limits   COMPREHENSIVE METABOLIC PANEL   LIPASE          Imaging Results              CT Abdomen Pelvis With Contrast (Final result)  Result time 10/26/22 11:26:00      Final result by TREVOR Rodriguez Sr., MD (10/26/22 11:26:00)                   Impression:      1. There is a prominent amount of fecal material within the colon.  2. The uterus is absent. The appendix is not definitely visualized. There are no inflammatory changes in the expected location of the appendix.  3. There is a mild amount dextroconvex curvature of the thoracolumbar spine.  4. There are mild degenerative changes between L2 and L3. There are moderate degenerative changes between L3 and L4.  5. There are minimal dependent atelectatic changes in both lungs.  There is a 4 mm noncalcified pulmonary nodule in the inferior aspect of the lingula.  All CT scans at this facility use dose modulation, iterative reconstruction, and/or weight base dosing when appropriate to reduce radiation dose when appropriate to reduce radiation dose to as low as reasonably achievable.      Electronically signed by: Neo Rodriguez  MD  Date:    10/26/2022  Time:    11:26               Narrative:    EXAMINATION:  CT ABDOMEN PELVIS WITH CONTRAST    CLINICAL HISTORY:  Abdominal pain, acute, nonlocalized;    TECHNIQUE:  Standard abdomen and pelvis CT protocol with IV contrast was performed.  100 mL of Omnipaque 350 contrast material was used for this examination.  There was no oral contrast administered.    COMPARISON:  None    FINDINGS:  Finding: The size of the heart is within normal limits.  There are minimal dependent atelectatic changes in both lungs.  There is a 4 mm noncalcified pulmonary nodule in the inferior aspect of the lingula.  There is no pneumothorax or pleural effusion.    The liver, gallbladder, pancreas, spleen, adrenals, and kidneys are normal in appearance. The ureters and the urinary bladder are normal in appearance.  The uterus is absent.  The appendix is not definitely visualized.  There are no inflammatory changes in the expected location of the appendix.  There is a prominent amount of fecal material within the colon.  The appendix and the rest of the gastrointestinal system are normal in appearance. There is no free fluid within the abdomen or pelvis. There is no pneumoperitoneum.  There is a mild amount of atherosclerosis.  There is a mild amount dextroconvex curvature of the thoracolumbar spine.  There are mild degenerative changes between L2 and L3.  There are moderate degenerative changes between L3 and L4.                                       Medications   ondansetron injection 4 mg (4 mg Intravenous Given 10/26/22 6040)   0.9%  NaCl infusion ( Intravenous New Bag 10/26/22 3470)   iohexoL (OMNIPAQUE 350) injection 100 mL (100 mLs Intravenous Given 10/26/22 1104)     Medical Decision Making:   Initial Assessment:   61-year-old female with vomiting today with diffuse abdominal pain.  Last bowel movement yesterday.  Vital signs unremarkable.  Diffuse abdominal tenderness without focality.  No peritoneal signs.  Labs  are grossly unremarkable.  CT abdomen pelvis shows large stool burden consistent with constipation.  No evidence of bowel obstruction.  No other acute surgical findings.  Suspect her constipation is the cause of her pain.  Will discharge home with stool softener and Zofran for symptomatic relief.  Encourage hydration.  Return instructions given.  Patient verbalized understanding and agreement with the plan.                        Clinical Impression:   Final diagnoses:  [R11.2] Nausea and vomiting, unspecified vomiting type (Primary)  [K59.00] Constipation, unspecified constipation type        ED Disposition Condition    Discharge Stable          ED Prescriptions       Medication Sig Dispense Start Date End Date Auth. Provider    docusate sodium (COLACE) 100 MG capsule Take 1 capsule (100 mg total) by mouth 2 (two) times daily as needed for Constipation. 60 capsule 10/26/2022 -- Karan Agustin MD    ondansetron (ZOFRAN-ODT) 4 MG TbDL Take 1 tablet (4 mg total) by mouth every 6 (six) hours as needed (nausea/vomiting). 20 tablet 10/26/2022 -- Karan Agustin MD          Follow-up Information       Follow up With Specialties Details Why Contact Info    [facility] Schedule an appointment as soon as possible for a visit in 3 days  [address] [place] [address] [id number] [phone]               Karan Agustin MD  10/26/22 5264

## 2022-10-26 NOTE — Clinical Note
"Annie Calderón (Lilly)ey was seen and treated in our emergency department on 10/26/2022.  She may return to work on 10/27/2022.       If you have any questions or concerns, please don't hesitate to call.       RN    "

## 2023-01-02 ENCOUNTER — HOSPITAL ENCOUNTER (EMERGENCY)
Facility: HOSPITAL | Age: 63
Discharge: HOME | End: 2023-01-02
Attending: EMERGENCY MEDICINE
Payer: MEDICAID

## 2023-01-02 VITALS
DIASTOLIC BLOOD PRESSURE: 57 MMHG | WEIGHT: 131 LBS | TEMPERATURE: 98 F | HEART RATE: 66 BPM | BODY MASS INDEX: 23.96 KG/M2 | OXYGEN SATURATION: 98 % | SYSTOLIC BLOOD PRESSURE: 123 MMHG | RESPIRATION RATE: 16 BRPM

## 2023-01-02 DIAGNOSIS — K02.9 DENTAL CARIES: ICD-10-CM

## 2023-01-02 DIAGNOSIS — K08.89 PAIN, DENTAL: Primary | ICD-10-CM

## 2023-01-02 PROCEDURE — 99284 EMERGENCY DEPT VISIT MOD MDM: CPT | Mod: ER

## 2023-01-02 RX ORDER — KETOROLAC TROMETHAMINE 10 MG/1
10 TABLET, FILM COATED ORAL EVERY 6 HOURS
Qty: 12 TABLET | Refills: 0 | Status: SHIPPED | OUTPATIENT
Start: 2023-01-02 | End: 2023-01-05

## 2023-01-02 RX ORDER — PENICILLIN V POTASSIUM 500 MG/1
500 TABLET, FILM COATED ORAL 4 TIMES DAILY
Qty: 40 TABLET | Refills: 0 | Status: SHIPPED | OUTPATIENT
Start: 2023-01-02 | End: 2023-01-09

## 2023-01-02 NOTE — Clinical Note
"Annie Manriquez" Vidal was seen and treated in our emergency department on 1/2/2023.  She may return to work on 01/03/2023.       If you have any questions or concerns, please don't hesitate to call.      Fabi Mcdonnell PA-C"

## 2023-01-03 NOTE — ED PROVIDER NOTES
Encounter Date: 1/2/2023       History     Chief Complaint   Patient presents with    Dental Pain     I am having right side dental/jaw mouth pain. I had a filling a while back. It started two days ago.      HPI: Annie Drake, a 62 y.o. female  has a past medical history of Arthritis, Disc degeneration, Osteoarthritis, and Thyroid disease.     She presents to the ED for evaluation of right upper dental pain.  Denies definable abscess.  No fevers.  States she is trying to find dental care.          The history is provided by the patient.   Review of patient's allergies indicates:  No Known Allergies  Past Medical History:   Diagnosis Date    Arthritis     Disc degeneration     Osteoarthritis     Thyroid disease      Past Surgical History:   Procedure Laterality Date    FOOT FRACTURE SURGERY Left     HYSTERECTOMY      RECONSTRUCTION OF NOSE       Family History   Problem Relation Age of Onset    Asthma Mother     Cancer Mother     No Known Problems Father     No Known Problems Son      Social History     Tobacco Use    Smoking status: Never    Smokeless tobacco: Never   Substance Use Topics    Alcohol use: No    Drug use: No     Review of Systems   Constitutional:  Negative for fever.   HENT:  Positive for dental problem. Negative for facial swelling.    Gastrointestinal:  Negative for nausea and vomiting.   Skin:  Negative for color change.   Allergic/Immunologic: Negative for immunocompromised state.   Neurological:  Negative for weakness.   Hematological:  Negative for adenopathy.   Psychiatric/Behavioral:  Negative for agitation.    All other systems reviewed and are negative.    Physical Exam     Initial Vitals [01/02/23 1329]   BP Pulse Resp Temp SpO2   (!) 123/57 66 16 98.2 °F (36.8 °C) 98 %      MAP       --         Physical Exam    Nursing note and vitals reviewed.  Constitutional: She appears well-developed and well-nourished. She is not diaphoretic. No distress.   HENT:   Head: Normocephalic and  atraumatic.   Right Ear: External ear normal.   Left Ear: External ear normal.   Nose: Nose normal.   Mouth/Throat: Abnormal dentition (overall poor dentition wtih multiple teeth fractured at gumline). Dental caries present. No dental abscesses.   Eyes: Conjunctivae and EOM are normal.   Neck:   Normal range of motion.  Cardiovascular:  Normal rate and regular rhythm.           Pulmonary/Chest: No respiratory distress.   Musculoskeletal:         General: Normal range of motion.      Cervical back: Normal range of motion.     Neurological: She is alert and oriented to person, place, and time.   Skin: Capillary refill takes less than 2 seconds. No rash noted.   Psychiatric: She has a normal mood and affect. Thought content normal.       ED Course   Procedures  Labs Reviewed - No data to display       Imaging Results    None          Medications - No data to display  Medical Decision Making:   Initial Assessment:   Dental pain   Differential Diagnosis:   Dental an, dental abscess, pulpitis, gingivitis, fractured tooth       ED Management:  Patient presents to ED for concern of dental pain.  There is no identifiable drainable abscess.  No trismus.  No facial swelling.  Symptoms and exam concerning for developing abscess.  Patient will be prescribed a course of anti-inflammatories and antibiotics and highly encouraged to follow up with dentist.  Strict return precautions given and patient verbalized understanding and agreement with plan.                            Clinical Impression:   Final diagnoses:  [K08.89] Pain, dental (Primary)  [K02.9] Dental caries        ED Disposition Condition    Discharge Stable          ED Prescriptions       Medication Sig Dispense Start Date End Date Auth. Provider    penicillin v potassium (VEETID) 500 MG tablet Take 1 tablet (500 mg total) by mouth 4 (four) times daily. for 7 days 40 tablet 1/2/2023 1/9/2023 Fabi Mcdonnell PA-C    ketorolac (TORADOL) 10 mg tablet Take 1 tablet (10  mg total) by mouth every 6 (six) hours. for 3 days 12 tablet 1/2/2023 1/5/2023 Fabi Mcdonnell PA-C          Follow-up Information       Follow up With Specialties Details Why Contact Info    [facility]   [address] [place] [id number]  [phone]               Fabi Mcdonnell PA-C  01/02/23 2025

## 2023-05-15 ENCOUNTER — HOSPITAL ENCOUNTER (EMERGENCY)
Facility: HOSPITAL | Age: 63
Discharge: HOME | End: 2023-05-15
Attending: EMERGENCY MEDICINE
Payer: MEDICAID

## 2023-05-15 VITALS
TEMPERATURE: 99 F | HEART RATE: 58 BPM | BODY MASS INDEX: 23 KG/M2 | WEIGHT: 125 LBS | SYSTOLIC BLOOD PRESSURE: 150 MMHG | OXYGEN SATURATION: 100 % | RESPIRATION RATE: 20 BRPM | HEIGHT: 62 IN | DIASTOLIC BLOOD PRESSURE: 76 MMHG

## 2023-05-15 DIAGNOSIS — A59.9 TRICHOMONAS INFECTION: ICD-10-CM

## 2023-05-15 DIAGNOSIS — G89.4 CHRONIC PAIN SYNDROME: Primary | ICD-10-CM

## 2023-05-15 LAB
BILIRUB UR QL STRIP: NEGATIVE
CLARITY UR REFRACT.AUTO: CLEAR
COLOR UR AUTO: YELLOW
GLUCOSE UR QL STRIP: NEGATIVE
HGB UR QL STRIP: ABNORMAL
KETONES UR QL STRIP: NEGATIVE
LEUKOCYTE ESTERASE UR QL STRIP: ABNORMAL
MICROSCOPIC COMMENT: ABNORMAL
NITRITE UR QL STRIP: NEGATIVE
PH UR STRIP: 6 [PH] (ref 5–8)
PROT UR QL STRIP: NEGATIVE
RBC #/AREA URNS AUTO: 15 /HPF (ref 0–4)
SP GR UR STRIP: >=1.03 (ref 1–1.03)
TRICHOMONAS UR QL COMP ASSIST: ABNORMAL
URN SPEC COLLECT METH UR: ABNORMAL
UROBILINOGEN UR STRIP-ACNC: NEGATIVE EU/DL
WBC #/AREA URNS AUTO: 30 /HPF (ref 0–5)
WBC CLUMPS UR QL AUTO: ABNORMAL

## 2023-05-15 PROCEDURE — 87086 URINE CULTURE/COLONY COUNT: CPT | Mod: ER | Performed by: PHYSICIAN ASSISTANT

## 2023-05-15 PROCEDURE — 81000 URINALYSIS NONAUTO W/SCOPE: CPT | Mod: ER | Performed by: PHYSICIAN ASSISTANT

## 2023-05-15 PROCEDURE — 99284 EMERGENCY DEPT VISIT MOD MDM: CPT | Mod: ER

## 2023-05-15 RX ORDER — METHOCARBAMOL 750 MG/1
1500 TABLET, FILM COATED ORAL 3 TIMES DAILY
Qty: 30 TABLET | Refills: 0 | Status: SHIPPED | OUTPATIENT
Start: 2023-05-15 | End: 2023-05-20

## 2023-05-15 RX ORDER — MELOXICAM 7.5 MG/1
7.5 TABLET ORAL DAILY
Qty: 30 TABLET | Refills: 0 | Status: SHIPPED | OUTPATIENT
Start: 2023-05-15

## 2023-05-15 RX ORDER — METRONIDAZOLE 500 MG/1
500 TABLET ORAL EVERY 12 HOURS
Qty: 14 TABLET | Refills: 0 | Status: SHIPPED | OUTPATIENT
Start: 2023-05-15 | End: 2023-05-22

## 2023-05-15 NOTE — ED PROVIDER NOTES
Encounter Date: 5/15/2023       History     Chief Complaint   Patient presents with    [facility] Medical Complaints     PT reports chronic pain in pelvis, hips, etc. PT reports urinary incontinence. Pt reports decreased appetite. PT reports she is tired of suffering. Denies SI or HI     HPI: Annie Drake, a 62 y.o. female  has a past medical history of Arthritis, Disc degeneration, Osteoarthritis, and Thyroid disease.     She presents to the ED with report of chronic pain and pelvis, hips and back as well as urinary incontinence.  She states that these issues have been going on for several years.  She has tried physical therapy but states that it is too painful.  Not currently on any chronic medicines for this issue.  No recent injury or fall.  Denies any dysuria, hematuria, vaginal itching or burning.        The history is provided by the patient.   Review of patient's allergies indicates:  No Known Allergies  Past Medical History:   Diagnosis Date    Arthritis     Disc degeneration     Osteoarthritis     Thyroid disease      Past Surgical History:   Procedure Laterality Date    FOOT FRACTURE SURGERY Left     HYSTERECTOMY      RECONSTRUCTION OF NOSE       Family History   Problem Relation Age of Onset    Asthma Mother     Cancer Mother     No Known Problems Father     No Known Problems Son      Social History     Tobacco Use    Smoking status: Never    Smokeless tobacco: Never   Substance Use Topics    Alcohol use: No    Drug use: No     Review of Systems   Constitutional:  Negative for fever.   HENT:  Negative for congestion.    Genitourinary:         Urinary incontinence times several years   Musculoskeletal:  Positive for arthralgias. Negative for neck stiffness.   Skin:  Negative for color change and rash.   Allergic/Immunologic: Negative for immunocompromised state.   Neurological:  Negative for weakness and numbness.   Hematological:  Negative for adenopathy.   Psychiatric/Behavioral:  Negative for agitation.     All other systems reviewed and are negative.    Physical Exam     Initial Vitals [05/15/23 0932]   BP Pulse Resp Temp SpO2   134/73 63 20 98.6 °F (37 °C) 100 %      MAP       --         Physical Exam    Nursing note and vitals reviewed.  Constitutional: She appears well-developed and well-nourished. She is not diaphoretic. No distress.   HENT:   Head: Normocephalic and atraumatic.   Right Ear: External ear normal.   Left Ear: External ear normal.   Nose: Nose normal.   Eyes: Conjunctivae and EOM are normal.   Neck:   Normal range of motion.  Cardiovascular:  Normal rate and regular rhythm.           Pulmonary/Chest: No respiratory distress. She has no wheezes. She has no rhonchi. She has no rales.   Abdominal: Abdomen is soft. Bowel sounds are normal. She exhibits no distension. There is no abdominal tenderness. There is no rebound and no guarding.   Musculoskeletal:         General: Normal range of motion.      Cervical back: Normal and normal range of motion.      Thoracic back: Normal.      Lumbar back: Normal.     Neurological: She is alert and oriented to person, place, and time.   Skin: Capillary refill takes less than 2 seconds. No rash noted.   Psychiatric: She has a normal mood and affect. Thought content normal.       ED Course   Procedures  Labs Reviewed   URINALYSIS, REFLEX TO URINE CULTURE - Abnormal; Notable for the following components:       Result Value    Specific Gravity, UA >=1.030 (*)     Occult Blood UA Trace (*)     Leukocytes, UA 1+ (*)     All other components within normal limits    Narrative:     Preferred Collection Type->Urine, Clean Catch  Specimen Source->Urine   URINALYSIS MICROSCOPIC - Abnormal; Notable for the following components:    RBC, UA 15 (*)     WBC, UA 30 (*)     Trichomonas, UA Rare (*)     All other components within normal limits    Narrative:     Preferred Collection Type->Urine, Clean Catch  Specimen Source->Urine   CULTURE, URINE          Imaging Results    None           Medications - No data to display  Medical Decision Making:   Initial Assessment:   Chronic back pain, urinary incontinence  Differential Diagnosis:   Muscular strain, UTI, STI  Clinical Tests:   Lab Tests: Ordered and Reviewed  The following lab test(s) were unremarkable: Urinalysis  ED Management:  Patient presents to the ER for evaluation of her chronic back and pelvis pain.  No concerns for an infectious process for this issue.  Her incontinence has been at its baseline for several years.  UA concerning for Trichomonas.  She was encouraged to have full STD panel workup done with her primary care doctor will be prescribed a course of Flagyl, Mobic and Robaxin.  Encouraged follow-up with her PCP for pain management referral and return for any new or worsening symptoms.  Patient verbalized understanding and agreement plan.                        Clinical Impression:   Final diagnoses:  [G89.4] Chronic pain syndrome (Primary)  [A59.9] Trichomonas infection               Fabi Mcdonnell PA-C  05/15/23 1248

## 2023-05-15 NOTE — Clinical Note
"Annie Manriquez" Vidal was seen and treated in our emergency department on 5/15/2023.  She may return to work on 05/16/2023.       If you have any questions or concerns, please don't hesitate to call.      Fabi Mcdonnell PA-C"

## 2023-05-16 LAB
BACTERIA UR CULT: NORMAL
BACTERIA UR CULT: NORMAL

## 2023-07-11 ENCOUNTER — HOSPITAL ENCOUNTER (EMERGENCY)
Facility: HOSPITAL | Age: 63
Discharge: HOME | End: 2023-07-11
Attending: EMERGENCY MEDICINE
Payer: MEDICAID

## 2023-07-11 VITALS
RESPIRATION RATE: 20 BRPM | OXYGEN SATURATION: 98 % | HEART RATE: 50 BPM | SYSTOLIC BLOOD PRESSURE: 152 MMHG | DIASTOLIC BLOOD PRESSURE: 67 MMHG | TEMPERATURE: 98 F

## 2023-07-11 DIAGNOSIS — M25.552 CHRONIC LEFT HIP PAIN: ICD-10-CM

## 2023-07-11 DIAGNOSIS — M54.16 LUMBAR RADICULOPATHY: Primary | ICD-10-CM

## 2023-07-11 DIAGNOSIS — G89.29 CHRONIC LEFT HIP PAIN: ICD-10-CM

## 2023-07-11 PROCEDURE — 63600175 PHARM REV CODE 636 W HCPCS: Performed by: PHYSICIAN ASSISTANT

## 2023-07-11 PROCEDURE — 99284 EMERGENCY DEPT VISIT MOD MDM: CPT

## 2023-07-11 PROCEDURE — 96372 THER/PROPH/DIAG INJ SC/IM: CPT | Performed by: PHYSICIAN ASSISTANT

## 2023-07-11 RX ORDER — NAPROXEN 500 MG/1
250 TABLET ORAL 2 TIMES DAILY PRN
Qty: 10 TABLET | Refills: 0 | Status: SHIPPED | OUTPATIENT
Start: 2023-07-11

## 2023-07-11 RX ORDER — KETOROLAC TROMETHAMINE 30 MG/ML
30 INJECTION, SOLUTION INTRAMUSCULAR; INTRAVENOUS
Status: COMPLETED | OUTPATIENT
Start: 2023-07-11 | End: 2023-07-11

## 2023-07-11 RX ORDER — LIDOCAINE 50 MG/G
1 PATCH TOPICAL DAILY
Qty: 15 PATCH | Refills: 0 | Status: SHIPPED | OUTPATIENT
Start: 2023-07-11

## 2023-07-11 RX ORDER — METHOCARBAMOL 500 MG/1
500 TABLET, FILM COATED ORAL 3 TIMES DAILY PRN
Qty: 15 TABLET | Refills: 0 | Status: SHIPPED | OUTPATIENT
Start: 2023-07-11

## 2023-07-11 RX ADMIN — KETOROLAC TROMETHAMINE 30 MG: 30 INJECTION, SOLUTION INTRAMUSCULAR; INTRAVENOUS at 04:07

## 2023-07-11 NOTE — ED TRIAGE NOTES
Hip Pain (Right hip pain, x few months, states she believes it is out of place, patient ambulating around department).

## 2023-07-11 NOTE — DISCHARGE INSTRUCTIONS
Take Tylenol, naproxen, Robaxin, and Lidoderm patches as needed for pain.  Naproxen as an anti-inflammatory.  Do not take with other anti-inflammatories such as Advil, Aleve, ibuprofen.  Robaxin as a muscle relaxant and may make you drowsy.  Do not drink alcohol or drive while taking this medication.  Use 1 Lidoderm patch in a 24 hour period.  Keep on your skin for 12 hours and remove for 12 hours before applying the next.  Follow-up with orthopedics or return to the emergency department sooner for any new or worsening symptoms.

## 2023-07-11 NOTE — FIRST PROVIDER EVALUATION
Emergency Department TeleTriage Encounter Note      CHIEF COMPLAINT    Chief Complaint   Patient presents with    Hip Pain     Right hip pain, x few months, states she believes it is out of place, patient ambulating around department       VITAL SIGNS   Initial Vitals   BP Pulse Resp Temp SpO2   07/11/23 1430 07/11/23 1430 07/11/23 1430 07/11/23 1432 07/11/23 1430   (!) 152/67 (!) 50 20 97.9 °F (36.6 °C) 98 %      MAP       --                   ALLERGIES    Review of patient's allergies indicates:  No Known Allergies    PROVIDER TRIAGE NOTE  Patient presents with exacerbation of chronic hip pain. No new injury.       ORDERS  Labs Reviewed - No data to display    ED Orders (720h ago, onward)      None              Virtual Visit Note: The provider triage portion of this emergency department evaluation and documentation was performed via [employer], a HIPAA-compliant telemedicine application, in concert with a tele-presenter in the room. A face to face patient evaluation with one of my colleagues will occur once the patient is placed in an emergency department room.      DISCLAIMER: This note was prepared with [employer] voice recognition transcription software. Garbled syntax, mangled pronouns, and other bizarre constructions may be attributed to that software system.
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
## 2023-07-11 NOTE — ED NOTES
Patient identifiers verified and correct for   LOC: The patient is awake, alert and aware of environment with an appropriate affect, the patient is oriented x 3 and speaking appropriately.   APPEARANCE: Patient appears comfortable and in no acute distress, patient is clean and well groomed.  SKIN: The skin is warm and dry, color consistent with ethnicity, patient has normal skin turgor and moist mucus membranes, skin intact, no breakdown or bruising noted.   MUSCULOSKELETAL: Patient moving all extremities spontaneously, no swelling noted. Pt reports right hip pain.  RESPIRATORY: Airway is open and patent, respirations are spontaneous, patient has a normal effort and rate, no accessory muscle use noted, pt placed on continuous pulse ox with O2 sats noted at 97% on room air.  CARDIAC: Pt placed on cardiac monitor. Patient has a normal rate and regular rhythm, no edema noted, capillary refill < 3 seconds.   GASTRO: Soft and non tender to palpation, no distention noted, normoactive bowel sounds present in all four quadrants. Pt states bowel movements have been regular.  : Pt denies any pain or frequency with urination.  NEURO: Pt opens eyes spontaneously, behavior appropriate to situation, follows commands, facial expression symmetrical, bilateral hand grasp equal and even, purposeful motor response noted, normal sensation in all extremities when touched with a finger.

## 2023-07-12 ENCOUNTER — TELEPHONE (OUTPATIENT)
Dept: ORTHOPEDICS | Facility: CLINIC | Age: 63
End: 2023-07-12
Payer: MEDICAID

## 2023-07-12 NOTE — TELEPHONE ENCOUNTER
----- Message from Lalo Cortes sent at 7/12/2023 12:52 PM CDT -----  Contact: [phone]  the patient is calling to get scheduled for a appt.  Pt access tried but no appts are available.  the patient can be reached at.   [phone]

## 2023-07-12 NOTE — TELEPHONE ENCOUNTER
----- Message from Emilie Agustin sent at 7/12/2023  1:22 PM CDT -----  Regarding: returning call  The patient called states returning call phone issue earlier    No further information provided      Patient can be contacted @#   [phone]

## 2023-07-13 ENCOUNTER — TELEPHONE (OUTPATIENT)
Dept: NEUROSURGERY | Facility: CLINIC | Age: 63
End: 2023-07-13
Payer: MEDICAID

## 2023-07-13 NOTE — TELEPHONE ENCOUNTER
Called number back, no answer. If mike back please advise we have no spots this year for medicaid spine patients. Please refer to [facility]

## 2023-07-13 NOTE — TELEPHONE ENCOUNTER
----- Message from Melissa Dorantes RN sent at 7/12/2023  4:47 PM CDT -----  Regarding: FW: pt refferal    ----- Message -----  From: Vince Velsaquez  Sent: 7/12/2023  10:49 AM CDT  To: [facility] Staff  Subject: pt refferal                                      Name of Who is Calling:[facility]         What is the request in detail: Office inquiring if referral was received please advise   And contact when possible           Can the clinic reply by [facility]:         What Number to Call Back [facility]: [phone] ext [phone]

## 2023-11-10 ENCOUNTER — OFFICE VISIT (OUTPATIENT)
Dept: ORTHOPEDICS | Facility: CLINIC | Age: 63
End: 2023-11-10
Payer: MEDICAID

## 2023-11-10 VITALS
SYSTOLIC BLOOD PRESSURE: 125 MMHG | WEIGHT: 108.56 LBS | BODY MASS INDEX: 19.98 KG/M2 | DIASTOLIC BLOOD PRESSURE: 70 MMHG | HEART RATE: 55 BPM | HEIGHT: 62 IN

## 2023-11-10 DIAGNOSIS — G89.29 CHRONIC RIGHT HIP PAIN: ICD-10-CM

## 2023-11-10 DIAGNOSIS — M25.551 CHRONIC RIGHT HIP PAIN: ICD-10-CM

## 2023-11-10 DIAGNOSIS — G89.29 CHRONIC LEFT HIP PAIN: Primary | ICD-10-CM

## 2023-11-10 DIAGNOSIS — M25.552 CHRONIC LEFT HIP PAIN: Primary | ICD-10-CM

## 2023-11-10 PROCEDURE — 3008F PR BODY MASS INDEX (BMI) DOCUMENTED: ICD-10-PCS | Mod: CPTII,,,

## 2023-11-10 PROCEDURE — 99203 PR OFFICE/OUTPT VISIT, NEW, LEVL III, 30-44 MIN: ICD-10-PCS | Mod: S$PBB,,,

## 2023-11-10 PROCEDURE — 99203 OFFICE O/P NEW LOW 30 MIN: CPT | Mod: S$PBB,,,

## 2023-11-10 PROCEDURE — 3074F PR MOST RECENT SYSTOLIC BLOOD PRESSURE < 130 MM HG: ICD-10-PCS | Mod: CPTII,,,

## 2023-11-10 PROCEDURE — 3078F DIAST BP <80 MM HG: CPT | Mod: CPTII,,,

## 2023-11-10 PROCEDURE — 3008F BODY MASS INDEX DOCD: CPT | Mod: CPTII,,,

## 2023-11-10 PROCEDURE — 3078F PR MOST RECENT DIASTOLIC BLOOD PRESSURE < 80 MM HG: ICD-10-PCS | Mod: CPTII,,,

## 2023-11-10 PROCEDURE — 99999 PR PBB SHADOW E&M-EST. PATIENT-LVL III: ICD-10-PCS | Mod: PBBFAC,,,

## 2023-11-10 PROCEDURE — 3074F SYST BP LT 130 MM HG: CPT | Mod: CPTII,,,

## 2023-11-10 PROCEDURE — 99999 PR PBB SHADOW E&M-EST. PATIENT-LVL III: CPT | Mod: PBBFAC,,,

## 2023-11-10 PROCEDURE — 99213 OFFICE O/P EST LOW 20 MIN: CPT | Mod: PBBFAC,PN

## 2023-11-10 RX ORDER — GABAPENTIN 300 MG/1
300 CAPSULE ORAL 3 TIMES DAILY
Qty: 90 CAPSULE | Refills: 1 | Status: SHIPPED | OUTPATIENT
Start: 2023-11-10 | End: 2024-11-09

## 2023-11-10 NOTE — LETTER
November 10, 2023    Annie Drake  [address] [address]             [facility]  Orthopedics  Orthopedics  [address] [facility] [address]  Phone: [phone]  Fax: [phone]   November 10, 2023     Patient: Annie Drake   YOB: 1960   Date of Visit: 11/10/2023       To Whom it May Concern:    Annie Drake was seen in my clinic on 11/10/2023. We are going to trial intra-articular hip injections to determine efficacy and hopefully post-pone surgery.    Please excuse her from any classes or work missed.    If you have any questions or concerns, please don't hesitate to call.    Sincerely,         Aracelis Richardson PA-C

## 2023-11-10 NOTE — PROGRESS NOTES
Patient ID: Annie Drake is a 62 y.o. female    Pain of the Left Hip      History of Present Illness:    Annie Drake presents to clinic for bilateral hip pain, L > R. Patient here with sister. States history of low back/hip pain for years, was told she needs surgery. She tried to see neurosurgery but states they canceled due to insurance. Patient has been apparently told she needs surgery. The pain started 10+ years ago and is becoming progressively worse.  Pain is located over (points to) lateral hip, groin, and low back. She reports that the pain is a 9 /10 sharp pain toda. The pain is affecting ADLs and limiting desired level of activity. Denies numbness, tingling, radiation and inability to bear weight.    States she attempted PT but this was too painful. Had tordol injections by PCP with some improvement but pain always returned.     Occupation: [employer]    Ambulating: unassisted  Diabetic: no  Smoking: no  Hx of DVT/PE: no    PAST MEDICAL HISTORY:   Past Medical History:   Diagnosis Date    Arthritis     Disc degeneration     Osteoarthritis     Thyroid disease      PAST SURGICAL HISTORY:   Past Surgical History:   Procedure Laterality Date    FOOT FRACTURE SURGERY Left     HYSTERECTOMY      RECONSTRUCTION OF NOSE       FAMILY HISTORY:   Family History   Problem Relation Age of Onset    Asthma Mother     Cancer Mother     No Known Problems Father     No Known Problems Son      SOCIAL HISTORY:   Social History     Occupational History    Not on file   Tobacco Use    Smoking status: Never    Smokeless tobacco: Never   Substance and Sexual Activity    Alcohol use: No    Drug use: No    Sexual activity: Not on file        MEDICATIONS:   Current Outpatient Medications:     docusate sodium (COLACE) 100 MG capsule, Take 1 capsule (100 mg total) by mouth 2 (two) times daily as needed for Constipation., Disp: 60 capsule, Rfl: 0    gabapentin (NEURONTIN) 300 MG capsule, Take 1 capsule (300 mg total) by mouth 3 (three)  times daily., Disp: 90 capsule, Rfl: 1    levothyroxine (SYNTHROID) 50 MCG tablet, Take 1 tablet (50 mcg total) by mouth once daily., Disp: 30 tablet, Rfl: 0    LIDOcaine (LIDODERM) 5 %, Place 1 patch onto the skin once daily. Remove & Discard patch within 12 hours or as directed by MD, Disp: 15 patch, Rfl: 0    meloxicam (MOBIC) 7.5 MG tablet, Take 1 tablet (7.5 mg total) by mouth once daily., Disp: 30 tablet, Rfl: 0    methocarbamoL (ROBAXIN) 500 MG Tab, Take 1 tablet (500 mg total) by mouth 3 (three) times daily as needed (pain)., Disp: 15 tablet, Rfl: 0    naproxen (NAPROSYN) 500 MG tablet, Take 1 tablet (500 mg total) by mouth 2 (two) times daily with meals., Disp: 60 tablet, Rfl: 0    naproxen (NAPROSYN) 500 MG tablet, Take 0.5 tablets (250 mg total) by mouth 2 (two) times daily as needed (pain)., Disp: 10 tablet, Rfl: 0    naproxen sodium (ANAPROX) 220 MG tablet, Take 220 mg by mouth every 12 (twelve) hours., Disp: , Rfl:     ondansetron (ZOFRAN-ODT) 4 MG TbDL, Take 1 tablet (4 mg total) by mouth every 6 (six) hours as needed (nausea/vomiting)., Disp: 20 tablet, Rfl: 0  ALLERGIES: Review of patient's allergies indicates:  No Known Allergies      Physical Exam     Vitals:    11/10/23 1353   BP: 125/70   Pulse: (!) 55     Alert and oriented to person, place and time. No acute distress. Well-groomed, not ill appearing. Pupils round and reactive, normal respiratory effort, no audible wheezing.       General:  The patient is alert and oriented x 3.  Pressured and fast speech.    bilateral HIP EXAMINATION     OBSERVATION / INSPECTION  Gait:   Nonantalgic   Alignment:  Neutral   Scars:   None   Muscle atrophy: None   Effusion:  None   Warmth:  None   Leg lengths:   Equal     TENDERNESS         Trochanteric bursa   +  Piriformis    +  SI joint    -   Psoas tendon   -   Rectus insertion  -   Adductor insertion  -   Pubic symphysis  -     ROM: (* = pain)    Flexion:    110 degrees *  External rotation: 40 degrees  *  Internal rotation with axial load: 30 degrees  Internal rotation without axial load: 40 degrees  Abduction:  45 degrees *  Adduction:   20 degrees *    SPECIAL TESTS:  Pain w/ forced internal rotation (FADIR): +  Pain w/ forced external rotation (MARY): +  Circumduction test:    Negative   Stinchfield test:    +  Log roll:      +  Snapping hip (internal):   Negative   Sit-up pain:     Negative   Resisted sit-up pain:    Negative   Trendelenburg test:    Negative     + lumbar spinal tenderness    EXTREMITY NEURO-VASCULAR EXAMINATION:   Sensation:  Grossly intact to light touch all dermatomal regions.   Motor Function:  Fully intact motor function at hip, knee, foot and ankle    DTRs;  quadriceps and  achilles 2+.  No clonus and downgoing Babinski.    Vascular status:  DP and PT pulses 2+, brisk capillary refill, symmetric.    Skin: intact, compartments soft.    Imaging:     Bilateral hip X-rays ordered/reviewed by me showing no evidence of fracture or dislocation. There is no obvious malalignment. No evidence of masses, lesions or foreign bodies. No significant DJD.    Assessment & Plan    Chronic left hip pain  -     Ambulatory referral/consult to Orthopedics  -     gabapentin (NEURONTIN) 300 MG capsule; Take 1 capsule (300 mg total) by mouth 3 (three) times daily.  Dispense: 90 capsule; Refill: 1    Chronic right hip pain       I made the decision to obtain old records of the patient including previous notes and imaging. New imaging was ordered today of the extremity or extremities evaluated. I independently reviewed and interpreted the radiographs and/or MRIs/CT scan today as well as prior imaging.    We discussed at length different treatment options including conservative vs surgical management. These include anti-inflammatories, acetaminophen, rest, ice, heat, formal physical therapy including strengthening and stretching exercises, home exercise programs, injections, dry needling, and finally surgical  intervention.      Discussed with patient, sister, and son (via phone) that there is no significant degeneration on xray of her hips, however she does have positive provocative movements. Discussed trial of bilateral intra-articular hip injections as diagnostic and therapeutic to see if this helps pain. We will message pain management to get this set up. Discussed unfortunately due to insurance this is the only facility that would accept medicaid. Patient was frustrated I was not setting her up for surgery today.     We will also start her on gabapentin 300 mg tid, titration instructions provided.     Follow up: 2 weeks post injections, can be virtual if mychart is set up  X-rays next visit: none    All questions were answered and patient is agreeable to the above plan.

## 2023-11-13 ENCOUNTER — TELEPHONE (OUTPATIENT)
Dept: PAIN MEDICINE | Facility: CLINIC | Age: 63
End: 2023-11-13
Payer: MEDICAID

## 2023-11-13 DIAGNOSIS — G89.29 CHRONIC RIGHT HIP PAIN: ICD-10-CM

## 2023-11-13 DIAGNOSIS — G89.29 CHRONIC LEFT HIP PAIN: Primary | ICD-10-CM

## 2023-11-13 DIAGNOSIS — M25.551 CHRONIC RIGHT HIP PAIN: ICD-10-CM

## 2023-11-13 DIAGNOSIS — M25.552 CHRONIC LEFT HIP PAIN: Primary | ICD-10-CM

## 2023-11-13 NOTE — TELEPHONE ENCOUNTER
----- Message from Elikn Richardson PA-C sent at 2023 12:13 PM CST -----  Regarding: Order for LIZETH GODWIN    Patient Name: LIZETH GODWIN([id number])  Sex: Female  : 1960      PCP: KIP DURÁN    Center: [facility]     Types of orders made on 2023: Procedure Request    Order Date:2023  Ordering User:ELKIN RICHARDSON [[id number]]  Encounter Provider:Elkin Richardson PA-C [[id number]]  Authorizing Provid  er: Elkin Richardson PA-C [[id number]]  Supervising Provider:PAM HURTADO [[id number]]  Type of Supervision:Supervision Required  [facility][[id number]]    Common Order Information  Procedure -> Other (Specify location and laterality) Cmt: bilateral hip             intra-articular injection    Order Specific Information  Order: Procedure Order to Pain Management [Custom: [id number]]  Order #:            [id number]: 1 FUTURE    Priority: Routine  Class: Clinic Performed    Future Order Information      Expires on:2024            Expected by:2023                   Associated Diagnoses      M25.552, G89.29 Chronic left hip pain      M25.551, G89.29 Chronic right hip pain      Facility Name: -> [facility]           Priority: Routine  Class: Clinic Performed    Future Ord  er Information      Expires on:2024            Expected by:2023                   Associated Diagnoses      M25.552, G89.29 Chronic left hip pain      M25.551, G89.29 Chronic right hip pain      Procedure -> Other (Specify location and laterality) Cmt: bilateral hip                 intra-articular injection        Facility Name: -> [facility]

## 2023-11-13 NOTE — TELEPHONE ENCOUNTER
Direct to procedure request received. Information has been forwarded to provider for his review. Callback message left for patient. Discuss scheduling procedure date at this location based on the review from provider.

## 2023-11-16 ENCOUNTER — TELEPHONE (OUTPATIENT)
Dept: PAIN MEDICINE | Facility: CLINIC | Age: 63
End: 2023-11-16
Payer: MEDICAID

## 2023-11-16 NOTE — TELEPHONE ENCOUNTER
----- Message from Vincent Cabral Jr., MD sent at 11/13/2023  2:45 PM CST -----  Regarding: RE: Direct to procedure  OK to schedule b/l hip injections. Thanks.   ----- Message -----  From: Berto Vail LPN  Sent: 11/13/2023   2:29 PM CST  To: Vincent Cabral Jr., MD  Subject: Direct to procedure                              Direct to procedure request received from Aracelis Richardson PA-C for a bilateral hip  intra-articular injection, Dx: Chronic left hip pain, Chronic right hip pain.  Please, review chart notes and imaging. Advise for scheduling procedure.

## 2023-11-20 ENCOUNTER — TELEPHONE (OUTPATIENT)
Dept: PAIN MEDICINE | Facility: CLINIC | Age: 63
End: 2023-11-20
Payer: MEDICAID

## 2023-11-22 ENCOUNTER — TELEPHONE (OUTPATIENT)
Dept: PAIN MEDICINE | Facility: CLINIC | Age: 63
End: 2023-11-22
Payer: MEDICAID

## 2023-11-22 DIAGNOSIS — G89.29 CHRONIC PAIN OF BOTH HIPS: Primary | ICD-10-CM

## 2023-11-22 DIAGNOSIS — M25.552 CHRONIC PAIN OF BOTH HIPS: Primary | ICD-10-CM

## 2023-11-22 DIAGNOSIS — M25.551 CHRONIC PAIN OF BOTH HIPS: Primary | ICD-10-CM

## 2023-11-22 NOTE — TELEPHONE ENCOUNTER
Spoke with patient's sister, Glenny. Stated the patient's phone is difficult to get her on so she usually helps her out. She inquired if this call was regarding scheduling the pain procedure. Discussed scheduling on date 12/14/23. She agreed and will pass the information on to her sister.     Reviewed pre op instructions with patient:    Please leave jewelry and valuables at home or give them to your escort for safe keeping.  You will be required to have an adult to escort you after the procedure is over. Although we will not be sedating you for your procedure we ask for an escort for safety after the procedure.  Do not take over the counter blood thinning medications beginning 7 days before the procedure (aspirin, Motrin, ibuprofen, Advil, Aleve, naproxen). If you are taking prescribed thinners (Coumadin, warfarin, apixaban, Eliquis, Plavix, clopidogrel, Pletal, etc) we will obtain cardiac clearance from your cardiologist or provider that is monitoring your medications and levels to hold the medications if appropriate.  Cleanse your skin the evening before with an antibacterial soap (Dial or Hibiclens) and then repeat it again the morning of the procedure.  Do not apply lotions, creams, deodorants, perfumes, or colognes the day of the procedure.  You will be contacted the day before the procedure to be given the time to arrive the day of the procedure. If you are not contacted by the afternoon before the procedure you should contact [phone].  You may have a light meal up to 6 hours before the procedure.    Patient verbalized understanding of the instructions provided to them and clinic contact number was provided for any further questions they may have.

## 2025-06-18 ENCOUNTER — HOSPITAL ENCOUNTER (OUTPATIENT)
Dept: RADIOLOGY | Facility: HOSPITAL | Age: 65
Discharge: HOME | End: 2025-06-18
Attending: NURSE PRACTITIONER
Payer: MEDICAID

## 2025-06-18 DIAGNOSIS — M25.552 HIP PAIN, LEFT: ICD-10-CM

## 2025-06-18 DIAGNOSIS — M54.6 THORACIC SPINE PAIN: ICD-10-CM

## 2025-06-18 DIAGNOSIS — M51.16 LUMBAR DISC DISEASE WITH RADICULOPATHY: ICD-10-CM

## 2025-06-18 DIAGNOSIS — M25.551 HIP PAIN, RIGHT: ICD-10-CM

## 2025-06-18 PROCEDURE — 72100 X-RAY EXAM L-S SPINE 2/3 VWS: CPT | Mod: TC,FY,PN

## 2025-06-18 PROCEDURE — 72070 X-RAY EXAM THORAC SPINE 2VWS: CPT | Mod: 26,,, | Performed by: RADIOLOGY

## 2025-06-18 PROCEDURE — 73522 X-RAY EXAM HIPS BI 3-4 VIEWS: CPT | Mod: TC,FY,PN

## 2025-06-18 PROCEDURE — 72070 X-RAY EXAM THORAC SPINE 2VWS: CPT | Mod: TC,FY,PN

## 2025-06-18 PROCEDURE — 73522 X-RAY EXAM HIPS BI 3-4 VIEWS: CPT | Mod: 26,,, | Performed by: RADIOLOGY

## 2025-06-18 PROCEDURE — 72100 X-RAY EXAM L-S SPINE 2/3 VWS: CPT | Mod: 26,,, | Performed by: RADIOLOGY

## 2025-07-18 DIAGNOSIS — R00.1 BRADYCARDIA: Primary | ICD-10-CM

## 2025-07-18 DIAGNOSIS — R94.31 ABNORMAL EKG: ICD-10-CM

## 2025-07-18 DIAGNOSIS — K59.04 CHRONIC IDIOPATHIC CONSTIPATION: ICD-10-CM

## 2025-07-18 DIAGNOSIS — Z12.11 SCREEN FOR COLON CANCER: Primary | ICD-10-CM

## 2025-07-18 DIAGNOSIS — R63.0 ANOREXIA: ICD-10-CM

## 2025-08-06 DIAGNOSIS — N31.9 BLADDER DYSFUNCTION: Primary | ICD-10-CM

## 2025-08-06 DIAGNOSIS — R32 UNSPECIFIED URINARY INCONTINENCE: ICD-10-CM
